# Patient Record
Sex: FEMALE | Race: WHITE
[De-identification: names, ages, dates, MRNs, and addresses within clinical notes are randomized per-mention and may not be internally consistent; named-entity substitution may affect disease eponyms.]

---

## 2018-05-15 ENCOUNTER — HOSPITAL ENCOUNTER (OUTPATIENT)
Dept: HOSPITAL 93 - RX STUDY | Age: 83
Discharge: HOME | End: 2018-05-15
Attending: INTERNAL MEDICINE
Payer: COMMERCIAL

## 2018-05-15 DIAGNOSIS — R10.13: Primary | ICD-10-CM

## 2018-05-15 DIAGNOSIS — R12: ICD-10-CM

## 2018-05-15 DIAGNOSIS — R11.0: ICD-10-CM

## 2018-05-15 DIAGNOSIS — R11.2: ICD-10-CM

## 2020-11-10 ENCOUNTER — HOSPITAL ENCOUNTER (INPATIENT)
Dept: HOSPITAL 93 - ER | Age: 85
LOS: 6 days | Discharge: NURSING, REHAB OR LONG-TERM CARE | DRG: 522 | End: 2020-11-16
Attending: ORTHOPAEDIC SURGERY | Admitting: ORTHOPAEDIC SURGERY
Payer: COMMERCIAL

## 2020-11-10 VITALS — BODY MASS INDEX: 29.44 KG/M2 | WEIGHT: 160 LBS | HEIGHT: 62 IN

## 2020-11-10 DIAGNOSIS — Z20.828: ICD-10-CM

## 2020-11-10 DIAGNOSIS — D62: ICD-10-CM

## 2020-11-10 DIAGNOSIS — N39.0: ICD-10-CM

## 2020-11-10 DIAGNOSIS — S72.111A: Primary | ICD-10-CM

## 2020-11-10 DIAGNOSIS — M81.8: ICD-10-CM

## 2020-11-10 DIAGNOSIS — I10: ICD-10-CM
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
## 2020-11-10 PROCEDURE — 0SRR0JZ REPLACEMENT OF RIGHT HIP JOINT, FEMORAL SURFACE WITH SYNTHETIC SUBSTITUTE, OPEN APPROACH: ICD-10-PCS | Performed by: ORTHOPAEDIC SURGERY

## 2020-11-10 NOTE — NUR
SE RECIBE PACIENTE ALERTA Y ORIENTADA EN GUCCI ESHA ESFERAS QUIEN LLEGA
AMBULANCIA, PARAMEDICOS REFIERE PACIENTE PRESENTO TRAUMA POR CAIDE EL 5 DE
OCTUBRE. PACIENTE REFIERE TOM DOLOR EN CADERA RT.

## 2020-11-10 NOTE — NUR
SE ORIENTA A PTE Y FAMILIAR SOBRE PROCEDIMIENTO DE GIORGI, AMBOS REFIEREN ENTEN
MAITE. SE LIMPIA AREA ANTES DE PROCESO. 
SE INSERTA GIORGI #16 UTILIZANDO MEDIDAS ESTERIL, HAY RETORNO Y SE INFLA BALLON
CON 10CC DE SALINA. SE MARCIA PTE EN CAMA BAJA, BARANDAS ELEVADAS, FRENOS
AJUSTADOS Y TIMBRE ACCESIBLE.

## 2020-11-10 NOTE — NUR
SE RECIBE PACIENTE ALERTA Y ORIENTADA EN GUCCI ESHA ESFERAS. SE ORIENTA A
PACIENTE SOBRE PROCEDIMEINTO Y TX, REFIERE ENTENDER. MS. HALEY EXTRAE MUESTRAS
DE LABORATORIO CON MEDIDAS ASEPTICAS Y ENVIA A LABORATORIO.

## 2020-11-12 PROCEDURE — 30233N1 TRANSFUSION OF NONAUTOLOGOUS RED BLOOD CELLS INTO PERIPHERAL VEIN, PERCUTANEOUS APPROACH: ICD-10-PCS | Performed by: ORTHOPAEDIC SURGERY
